# Patient Record
Sex: FEMALE | Race: WHITE | NOT HISPANIC OR LATINO
[De-identification: names, ages, dates, MRNs, and addresses within clinical notes are randomized per-mention and may not be internally consistent; named-entity substitution may affect disease eponyms.]

---

## 2023-06-12 ENCOUNTER — RX ONLY (OUTPATIENT)
Age: 53
Setting detail: RX ONLY
End: 2023-06-12

## 2023-06-12 ENCOUNTER — APPOINTMENT (RX ONLY)
Dept: URBAN - METROPOLITAN AREA CLINIC 101 | Facility: CLINIC | Age: 53
Setting detail: DERMATOLOGY
End: 2023-06-12

## 2023-06-12 VITALS — HEIGHT: 67 IN | WEIGHT: 145 LBS

## 2023-06-12 RX ORDER — OXYCODONE HYDROCHLORIDE 5 MG/1
TABLET ORAL
Qty: 20 | Refills: 0 | Status: ERX | COMMUNITY
Start: 2023-06-12

## 2023-06-12 RX ORDER — CEPHALEXIN 500 MG/1
1 CAPSULE ORAL QID
Qty: 28 | Refills: 0 | Status: ERX | COMMUNITY
Start: 2023-06-12

## 2023-06-14 ENCOUNTER — APPOINTMENT (RX ONLY)
Dept: URBAN - METROPOLITAN AREA CLINIC 101 | Facility: CLINIC | Age: 53
Setting detail: DERMATOLOGY
End: 2023-06-14

## 2023-06-14 DIAGNOSIS — Z41.9 ENCOUNTER FOR PROCEDURE FOR PURPOSES OTHER THAN REMEDYING HEALTH STATE, UNSPECIFIED: ICD-10-CM

## 2023-06-14 PROCEDURE — ? OPERATIVE NOTE - BRIEF

## 2023-06-14 NOTE — PROCEDURE: OPERATIVE NOTE - BRIEF
Detail Level: Detailed
Surgeon: Dr. Oscar Barreto
Estimated Blood Loss (Cc): minimal
Position: supine
Epidermal Closure: horizontal mattress

## 2023-06-26 ENCOUNTER — APPOINTMENT (RX ONLY)
Dept: URBAN - METROPOLITAN AREA CLINIC 101 | Facility: CLINIC | Age: 53
Setting detail: DERMATOLOGY
End: 2023-06-26

## 2023-06-26 DIAGNOSIS — Z41.9 ENCOUNTER FOR PROCEDURE FOR PURPOSES OTHER THAN REMEDYING HEALTH STATE, UNSPECIFIED: ICD-10-CM

## 2023-06-26 PROCEDURE — ? ADDITIONAL NOTES

## 2023-06-26 NOTE — PROCEDURE: ADDITIONAL NOTES
Render Risk Assessment In Note?: no
Additional Notes: -------------------------------\\nPostoperative Check:\\n-------------------------------\\n\\nINTERVAL HISTORY:\\n-------------------------------\\nPatient is  now almost 2 weeks s/p abdominal liposuction performed on 6/14/2023. Patient reports that since surgery she has been doing well. Patient denies fevers or chills. Patient has been wearing compression as advised. \\n\\nEXAM:\\n-------------------------------\\nAbdomen: Stab incisions at bilateral hips and umbilicus healing well. Smooth and flat contour of abdomen. Minimal to moderate edema as expected. No erythema or signs of infection. \\n\\nASSESSMENT/PLAN:\\n-------------------------------\\nAlmost 2 weeks s/p abdominal liposuction. Patient is doing well at this time. \\n\\n- Post-op restrictions reviewed \\n- Continue to wear abdominal compression \\n- All the patient's questions were answered\\n- Follow up in 4 weeks and will plan to obtain postop pics at that time\\n\\nI counseled the patient regarding the following:\\n- Discussed the use of narcotic pain medication with patient. Patient instructed on the use of stool softeners for regular bowel movements after surgery. \\n- Contact office if: the patient experiences purulent drainage, redness, swelling, shortness of breath, chest pain, calf pain, severe pain or tenderness, motor weakness, fever, nausea, an expanding mass, or other concerning symptoms.

## 2023-07-24 ENCOUNTER — APPOINTMENT (RX ONLY)
Dept: URBAN - METROPOLITAN AREA CLINIC 101 | Facility: CLINIC | Age: 53
Setting detail: DERMATOLOGY
End: 2023-07-24

## 2023-07-24 DIAGNOSIS — Z41.9 ENCOUNTER FOR PROCEDURE FOR PURPOSES OTHER THAN REMEDYING HEALTH STATE, UNSPECIFIED: ICD-10-CM

## 2023-07-24 PROCEDURE — ? PHOTOS OBTAINED

## 2023-07-24 PROCEDURE — ? ADDITIONAL NOTES

## 2023-07-24 NOTE — PROCEDURE: ADDITIONAL NOTES
Render Risk Assessment In Note?: no
Additional Notes: -------------------------------\\nPostoperative Check:\\n-------------------------------\\n\\nINTERVAL HISTORY:\\n-------------------------------\\nPatient is now almost 6 weeks s/p abdominal liposuction performed on 6/14/2023. Patient reports that since surgery she has been doing well. Patient denies fevers or chills. Patient reports stopping consistent use of compression after last seen. \\n\\n\\nEXAM:\\n-------------------------------\\nAbdomen: Well healed scars on bilateral hips and umbilicus. Excellent abdominal contour. Some remaining edema present. No erythema or signs of infection. \\n\\n\\nASSESSMENT/PLAN:\\n-------------------------------\\nAlmost 6 weeks s/p abdominal liposuction. Patient is doing well at this time. \\n\\n- All post-op restrictions lifted and patient can return to normal activity\\n- Can d/c use of abdominal compression or continue use at night \\n- All the patient's questions were answered\\n- Follow up in 6 weeks for regular postop check

## 2023-07-24 NOTE — PROCEDURE: PHOTOS OBTAINED
Detail Level: Detailed
Follow-Up Interval: day
Follow-Up For Additional Photos?: no
Photographed Locations: Photos were obtained of the surgical site(s).

## 2023-09-14 ENCOUNTER — APPOINTMENT (RX ONLY)
Dept: URBAN - METROPOLITAN AREA CLINIC 101 | Facility: CLINIC | Age: 53
Setting detail: DERMATOLOGY
End: 2023-09-14

## 2023-09-14 DIAGNOSIS — Z41.9 ENCOUNTER FOR PROCEDURE FOR PURPOSES OTHER THAN REMEDYING HEALTH STATE, UNSPECIFIED: ICD-10-CM

## 2023-09-14 PROCEDURE — ? ADDITIONAL NOTES

## 2023-09-14 NOTE — PROCEDURE: ADDITIONAL NOTES
Additional Notes: -------------------------------\\nPostoperative Check:\\n-------------------------------\\n\\nINTERVAL HISTORY:\\n-------------------------------\\nPatient is now 13 weeks s/p abdominal liposuction performed on 6/14/2023. Patient reports she has done well since her last visit. No other questions or concerns for today's visit. \\n\\n\\nEXAM:\\n-------------------------------\\nAbdomen: Well healed scars on bilateral hips and umbilicus. Excellent abdominal contour. Minimal remaining edema. No erythema or signs of infection. \\n\\n\\nASSESSMENT/PLAN:\\n-------------------------------\\n13 weeks s/p abdominal liposuction. Patient is doing well at this time. \\n\\n- Continue normal activity with no restrictions\\n- All the patient's questions were answered\\n- Follow up in 3 months
Render Risk Assessment In Note?: no